# Patient Record
Sex: MALE | Race: WHITE | NOT HISPANIC OR LATINO
[De-identification: names, ages, dates, MRNs, and addresses within clinical notes are randomized per-mention and may not be internally consistent; named-entity substitution may affect disease eponyms.]

---

## 2022-09-09 ENCOUNTER — NON-APPOINTMENT (OUTPATIENT)
Age: 52
End: 2022-09-09

## 2022-09-09 PROBLEM — Z00.00 ENCOUNTER FOR PREVENTIVE HEALTH EXAMINATION: Status: ACTIVE | Noted: 2022-09-09

## 2022-09-13 ENCOUNTER — NON-APPOINTMENT (OUTPATIENT)
Age: 52
End: 2022-09-13

## 2022-09-13 ENCOUNTER — APPOINTMENT (OUTPATIENT)
Dept: PULMONOLOGY | Facility: CLINIC | Age: 52
End: 2022-09-13

## 2022-09-13 VITALS
WEIGHT: 140 LBS | BODY MASS INDEX: 22.5 KG/M2 | SYSTOLIC BLOOD PRESSURE: 100 MMHG | TEMPERATURE: 96.9 F | HEIGHT: 66 IN | DIASTOLIC BLOOD PRESSURE: 70 MMHG | OXYGEN SATURATION: 96 % | HEART RATE: 68 BPM

## 2022-09-13 DIAGNOSIS — Z82.49 FAMILY HISTORY OF ISCHEMIC HEART DISEASE AND OTHER DISEASES OF THE CIRCULATORY SYSTEM: ICD-10-CM

## 2022-09-13 PROCEDURE — 99205 OFFICE O/P NEW HI 60 MIN: CPT | Mod: 25

## 2022-09-13 PROCEDURE — 94010 BREATHING CAPACITY TEST: CPT

## 2022-09-13 NOTE — DISCUSSION/SUMMARY
[FreeTextEntry1] :  Mr yS has a chronic cough for many years.  I had an extensive discussion with him regarding the causes of cough.  It does not seem that he has any obstructive airways disease.  He likely has an allergy component and possible postnasal drip/sinus disease.  He does cough often with eating and drinking.  Although it is unlikely in a young otherwise young healthy person aspiration during the swallowing mechanism must be considered.  I have asked him to obtain a chest x-ray and an x-ray of the sinuses.  I have asked him to take an antihistamine daily not intermittently and to start Astelin nasal spray 2 sprays in each nostril once or twice a day.  I have also referred him to the [place] speech therapy department for swallow eval.  The patient understands and agrees with this plan of care.\par The patient understands and agrees with plan of care.\par Today's office visit encompassed 46  minutes. I conducted an extensive history ,physical exam and reviewed diagnosis and treatment options  including diagnostic tests,radiologic studies including cat scans  and the use of prescription medication.

## 2022-09-13 NOTE — REASON FOR VISIT
[Initial] : an initial visit [Cough] : cough [TextBox_44] : assessment.  Patient complains of a cough that comes from no where, it just comes on and it feels like it's coming from the back of his neck.

## 2022-09-13 NOTE — HISTORY OF PRESENT ILLNESS
[Never] : never [TextBox_4] : 51 male no hx tobacco\par presents today bec  increase cough  with drinking liquid and eating for 3 years\par yesterday with dust exposure \par feels something in back of throat\par no phlegm  always clearing throat\par use claritin and zyrtec and some improvement\par precipitating factors eating sl phlegm\par no nite awakening rarely with cough\par rare gerd sx \par no animals no birds \par allergy to dust and cats\par occ  computer  no asbestos /chemical\par

## 2022-09-15 ENCOUNTER — NON-APPOINTMENT (OUTPATIENT)
Age: 52
End: 2022-09-15

## 2022-09-15 ENCOUNTER — TRANSCRIPTION ENCOUNTER (OUTPATIENT)
Age: 52
End: 2022-09-15

## 2022-09-16 ENCOUNTER — NON-APPOINTMENT (OUTPATIENT)
Age: 52
End: 2022-09-16

## 2022-09-16 ENCOUNTER — TRANSCRIPTION ENCOUNTER (OUTPATIENT)
Age: 52
End: 2022-09-16

## 2022-10-17 ENCOUNTER — APPOINTMENT (OUTPATIENT)
Dept: PULMONOLOGY | Facility: CLINIC | Age: 52
End: 2022-10-17

## 2022-10-17 VITALS
OXYGEN SATURATION: 98 % | TEMPERATURE: 97.1 F | SYSTOLIC BLOOD PRESSURE: 118 MMHG | BODY MASS INDEX: 22.5 KG/M2 | HEIGHT: 66 IN | DIASTOLIC BLOOD PRESSURE: 70 MMHG | WEIGHT: 140 LBS | HEART RATE: 68 BPM

## 2022-10-17 DIAGNOSIS — R09.82 POSTNASAL DRIP: ICD-10-CM

## 2022-10-17 DIAGNOSIS — R05.3 CHRONIC COUGH: ICD-10-CM

## 2022-10-17 PROCEDURE — 99214 OFFICE O/P EST MOD 30 MIN: CPT

## 2022-10-17 NOTE — REASON FOR VISIT
[Follow-Up] : a follow-up visit [Cough] : cough [TextBox_44] : 1 month, Chest and sinus xray performed. Pt states sx are about the same as last office visit.

## 2022-10-17 NOTE — HISTORY OF PRESENT ILLNESS
[Never] : never [TextBox_4] : 52 male cough with eating and drinking for 3 yr\par cxr and sinus xray normal\par swallow study pending\par using astelin bec of taste \par some improvement with zyrtec \par usu cough related bec of pnds  and mucous in back of throat and trigger by dry food ie fibre cereal and nuts\par no change iwht position but ?worse when lie down\par occ  no chemical

## 2022-10-17 NOTE — DISCUSSION/SUMMARY
[FreeTextEntry1] :  Mr Sy has a chronic cough.  It seems to be mainly postnasal drip and allergy related.  He has some improvement on the Zyrtec.  He was unable to tolerate the taste of the Astelin.  We will start Atrovent nasal spray 2 sprays twice a day.  The patient has a swallow study pending regarding aspiration and I await these results.\par The patient understands and agrees with plan of care.\par Today's office visit encompassed 32 minutes. I conducted an extensive history ,physical exam and reviewed diagnosis and treatment options  including diagnostic tests,radiologic studies including  cat scans  and the use of prescription medication.

## 2022-12-12 ENCOUNTER — APPOINTMENT (OUTPATIENT)
Dept: PULMONOLOGY | Facility: CLINIC | Age: 52
End: 2022-12-12

## 2022-12-23 ENCOUNTER — RESULT CHARGE (OUTPATIENT)
Age: 52
End: 2022-12-23

## 2022-12-23 ENCOUNTER — APPOINTMENT (OUTPATIENT)
Dept: UROLOGY | Facility: CLINIC | Age: 52
End: 2022-12-23

## 2022-12-23 VITALS
DIASTOLIC BLOOD PRESSURE: 74 MMHG | HEIGHT: 66 IN | HEART RATE: 65 BPM | WEIGHT: 140 LBS | TEMPERATURE: 96.2 F | BODY MASS INDEX: 22.5 KG/M2 | OXYGEN SATURATION: 98 % | SYSTOLIC BLOOD PRESSURE: 120 MMHG

## 2022-12-23 DIAGNOSIS — Z83.518 FAMILY HISTORY OF OTHER SPECIFIED EYE DISORDER: ICD-10-CM

## 2022-12-23 DIAGNOSIS — Z82.0 FAMILY HISTORY OF EPILEPSY AND OTHER DISEASES OF THE NERVOUS SYSTEM: ICD-10-CM

## 2022-12-23 DIAGNOSIS — Z78.9 OTHER SPECIFIED HEALTH STATUS: ICD-10-CM

## 2022-12-23 DIAGNOSIS — Z82.61 FAMILY HISTORY OF ARTHRITIS: ICD-10-CM

## 2022-12-23 LAB
BILIRUB UR QL STRIP: NEGATIVE
CLARITY UR: CLEAR
COLLECTION METHOD: NORMAL
GLUCOSE UR-MCNC: NEGATIVE
HCG UR QL: 0.2 EU/DL
HGB UR QL STRIP.AUTO: NEGATIVE
KETONES UR-MCNC: NEGATIVE
LEUKOCYTE ESTERASE UR QL STRIP: NEGATIVE
NITRITE UR QL STRIP: NEGATIVE
PH UR STRIP: 7
PROT UR STRIP-MCNC: NEGATIVE
SP GR UR STRIP: 1.01

## 2022-12-23 PROCEDURE — 99204 OFFICE O/P NEW MOD 45 MIN: CPT

## 2022-12-23 RX ORDER — AZELASTINE HYDROCHLORIDE 137 UG/1
137 SPRAY, METERED NASAL DAILY
Qty: 1 | Refills: 6 | Status: DISCONTINUED | COMMUNITY
Start: 2022-09-13 | End: 2022-12-23

## 2022-12-23 RX ORDER — IPRATROPIUM BROMIDE 21 UG/1
0.03 SPRAY NASAL TWICE DAILY
Qty: 1 | Refills: 3 | Status: DISCONTINUED | COMMUNITY
Start: 2022-10-17 | End: 2022-12-23

## 2022-12-23 NOTE — PHYSICAL EXAM
[General Appearance - Well Developed] : well developed [Edema] : no peripheral edema [] : no respiratory distress [Abdomen Soft] : soft [Urinary Bladder Findings] : the bladder was normal on palpation [FreeTextEntry1] : LES: v small prostate, 3x3, mild nodularity felt. [Normal Station and Gait] : the gait and station were normal for the patient's age [No Focal Deficits] : no focal deficits [Oriented To Time, Place, And Person] : oriented to person, place, and time

## 2022-12-23 NOTE — ASSESSMENT
[FreeTextEntry1] : 51 yo patient who presents to clinic today with complaints of LUTS:slow stream since he was 20 yrs old. has incomplete emptying as well. Quality of his stream is  poor. POC urine neg\par IPSS:15 / PENNIE=23\par Fam Hx: neg for PCa/bladder ca\par no hx of STI. \par \par Plan: r/o BPH, r/o urethral stricture.\par Try tamsulosin 0.4 mg QHS\par US KB PS and PVR\par need latest PSA result\par

## 2022-12-23 NOTE — HISTORY OF PRESENT ILLNESS
[FreeTextEntry1] : 53 yo patient who presents to clinic today with complaints of LUTS: patient is already on finasteride 1mg for hair loss.\par slow stream since he was 20 yrs old. has incomplete emptying as well. \par Wakes up 1 x /night to use bathroom. \par Quality of his stream is  poor\par  \par Bowel Movements are regular. Denies history of colonic diverticulum/diverticulitis.  \par No  surgical Hx including Uroflow, UDS, Cystoscopy. Denies past UTI Hx, denies fever, chills, sweats, flank pain.  Denies history of kidney stones or bladder stones.  No Neurologic Hx.  No Hx of DM \par \par IPSS:15 / PENNIE=23\par SMoker:no\par Fam Hx: neg for PCa/bladder ca\par no blood thinners\par \par no hx of STI. \par

## 2022-12-23 NOTE — HISTORY OF PRESENT ILLNESS
[FreeTextEntry1] : 51 yo patient who presents to clinic today with complaints of LUTS: patient is already on finasteride 1mg for hair loss.\par slow stream since he was 20 yrs old. has incomplete emptying as well. \par Wakes up 1 x /night to use bathroom. \par Quality of his stream is  poor\par  \par Bowel Movements are regular. Denies history of colonic diverticulum/diverticulitis.  \par No  surgical Hx including Uroflow, UDS, Cystoscopy. Denies past UTI Hx, denies fever, chills, sweats, flank pain.  Denies history of kidney stones or bladder stones.  No Neurologic Hx.  No Hx of DM \par \par IPSS:15 / PENNIE=23\par SMoker:no\par Fam Hx: neg for PCa/bladder ca\par no blood thinners\par \par no hx of STI. \par

## 2023-01-06 ENCOUNTER — APPOINTMENT (OUTPATIENT)
Dept: UROLOGY | Facility: CLINIC | Age: 53
End: 2023-01-06
Payer: COMMERCIAL

## 2023-01-06 VITALS
OXYGEN SATURATION: 99 % | DIASTOLIC BLOOD PRESSURE: 68 MMHG | TEMPERATURE: 96.4 F | BODY MASS INDEX: 22.5 KG/M2 | HEIGHT: 66 IN | WEIGHT: 140 LBS | SYSTOLIC BLOOD PRESSURE: 108 MMHG | HEART RATE: 58 BPM

## 2023-01-06 DIAGNOSIS — R39.12 POOR URINARY STREAM: ICD-10-CM

## 2023-01-06 DIAGNOSIS — R39.9 UNSPECIFIED SYMPTOMS AND SIGNS INVOLVING THE GENITOURINARY SYSTEM: ICD-10-CM

## 2023-01-06 PROCEDURE — 99213 OFFICE O/P EST LOW 20 MIN: CPT

## 2023-01-06 NOTE — HISTORY OF PRESENT ILLNESS
[FreeTextEntry1] : Past Uro Hx: 53 yo patient who presents to clinic today with complaints of LUTS: patient is already on finasteride 1mg for hair loss.\par slow stream since he was 20 yrs old. has incomplete emptying as well. \par Wakes up 1 x /night to use bathroom. \par Quality of his stream is  poor\par  \par IPSS:15 / PENNIE=23\par SMoker:no\par Fam Hx: neg for PCa/bladder ca\par no blood thinners\par no hx of STI. \par FU jan 2023:\par US KB: PS=24g/PVR=13cc /sx on flomax:improved. only complaint is post-micturition dribbling\par

## 2023-01-06 NOTE — PHYSICAL EXAM
[General Appearance - Well Developed] : well developed [Abdomen Soft] : soft [Urinary Bladder Findings] : the bladder was normal on palpation [Edema] : no peripheral edema [] : no respiratory distress [Oriented To Time, Place, And Person] : oriented to person, place, and time [Normal Station and Gait] : the gait and station were normal for the patient's age [No Focal Deficits] : no focal deficits [FreeTextEntry1] : LES: v small prostate, 3x3, mild nodularity felt.

## 2023-01-06 NOTE — ASSESSMENT
[FreeTextEntry1] : 51 yo patient who presents to clinic today with complaints of LUTS:slow stream since he was 20 yrs old. has incomplete emptying as well. Quality of his stream is  poor. POC urine neg\par IPSS:15 / PENNIE=23\par Fam Hx: neg for PCa/bladder ca\par no hx of STI. \par FU jan 2023:\par US KB: PS=24g/PVR=13cc /sx on flomax:improved. only complaint is post-micturition dribbling/ PSa=0.6\par \par Plan:\par reassure\par rtc 1 year w PSA

## 2023-01-17 ENCOUNTER — RX RENEWAL (OUTPATIENT)
Age: 53
End: 2023-01-17

## 2023-01-18 ENCOUNTER — TRANSCRIPTION ENCOUNTER (OUTPATIENT)
Age: 53
End: 2023-01-18

## 2023-02-20 ENCOUNTER — RX RENEWAL (OUTPATIENT)
Age: 53
End: 2023-02-20

## 2023-04-10 ENCOUNTER — RX RENEWAL (OUTPATIENT)
Age: 53
End: 2023-04-10

## 2023-05-06 ENCOUNTER — RX RENEWAL (OUTPATIENT)
Age: 53
End: 2023-05-06

## 2023-05-07 RX ORDER — TAMSULOSIN HYDROCHLORIDE 0.4 MG/1
0.4 CAPSULE ORAL
Qty: 30 | Refills: 4 | Status: ACTIVE | COMMUNITY
Start: 2022-12-23 | End: 1900-01-01

## 2023-08-08 DIAGNOSIS — Z12.11 ENCOUNTER FOR SCREENING FOR MALIGNANT NEOPLASM OF COLON: ICD-10-CM

## 2023-08-25 ENCOUNTER — NON-APPOINTMENT (OUTPATIENT)
Age: 53
End: 2023-08-25

## 2023-08-25 ENCOUNTER — APPOINTMENT (OUTPATIENT)
Dept: FAMILY MEDICINE | Facility: CLINIC | Age: 53
End: 2023-08-25
Payer: COMMERCIAL

## 2023-08-25 VITALS
RESPIRATION RATE: 15 BRPM | SYSTOLIC BLOOD PRESSURE: 104 MMHG | BODY MASS INDEX: 21.86 KG/M2 | HEIGHT: 66 IN | DIASTOLIC BLOOD PRESSURE: 60 MMHG | WEIGHT: 136 LBS | OXYGEN SATURATION: 98 % | HEART RATE: 81 BPM | TEMPERATURE: 98.2 F

## 2023-08-25 DIAGNOSIS — B36.0 PITYRIASIS VERSICOLOR: ICD-10-CM

## 2023-08-25 DIAGNOSIS — R79.89 OTHER SPECIFIED ABNORMAL FINDINGS OF BLOOD CHEMISTRY: ICD-10-CM

## 2023-08-25 DIAGNOSIS — L65.9 NONSCARRING HAIR LOSS, UNSPECIFIED: ICD-10-CM

## 2023-08-25 DIAGNOSIS — R42 DIZZINESS AND GIDDINESS: ICD-10-CM

## 2023-08-25 PROCEDURE — 99204 OFFICE O/P NEW MOD 45 MIN: CPT | Mod: 25

## 2023-08-25 PROCEDURE — 36415 COLL VENOUS BLD VENIPUNCTURE: CPT

## 2023-08-25 PROCEDURE — 93000 ELECTROCARDIOGRAM COMPLETE: CPT

## 2023-08-25 RX ORDER — FINASTERIDE 1 MG/1
1 TABLET ORAL DAILY
Qty: 90 | Refills: 3 | Status: ACTIVE | COMMUNITY
Start: 1900-01-01 | End: 1900-01-01

## 2023-08-25 RX ORDER — CETIRIZINE HCL 10 MG
10 TABLET ORAL
Refills: 0 | Status: ACTIVE | COMMUNITY

## 2023-08-25 NOTE — HEALTH RISK ASSESSMENT
[No falls in past year] : Patient reported no falls in the past year [Little interest or pleasure doing things] : 1) Little interest or pleasure doing things [Feeling down, depressed, or hopeless] : 2) Feeling down, depressed, or hopeless [0] : 2) Feeling down, depressed, or hopeless: Not at all (0) [PHQ-2 Negative - No further assessment needed] : PHQ-2 Negative - No further assessment needed [I have developed a follow-up plan documented below in the note.] : I have developed a follow-up plan documented below in the note. [Patient declined Low Dose CT Scan] : Patient declined Low Dose CT Scan [HIV test declined] : HIV test declined [Hepatitis C test declined] : Hepatitis C test declined [None] : None [With Family] : lives with family [# of Members in Household ___] :  household currently consist of [unfilled] member(s) [Employed] : employed [Graduate School] : graduate school [] :  [# Of Children ___] : has [unfilled] children [Sexually Active] : sexually active [Feels Safe at Home] : Feels safe at home [Fully functional (bathing, dressing, toileting, transferring, walking, feeding)] : Fully functional (bathing, dressing, toileting, transferring, walking, feeding) [Fully functional (using the telephone, shopping, preparing meals, housekeeping, doing laundry, using] : Fully functional and needs no help or supervision to perform IADLs (using the telephone, shopping, preparing meals, housekeeping, doing laundry, using transportation, managing medications and managing finances) [Reports normal functional visual acuity (ie: able to read med bottle)] : Reports normal functional visual acuity [Smoke Detector] : smoke detector [Carbon Monoxide Detector] : carbon monoxide detector [Safety elements used in home] : safety elements used in home [Seat Belt] :  uses seat belt [Sunscreen] : uses sunscreen [With Patient/Caregiver] : , with patient/caregiver [Designated Healthcare Proxy] : Designated healthcare proxy [Name: ___] : Health Care Proxy's Name: [unfilled]  [Relationship: ___] : Relationship: [unfilled] [Aggressive treatment] : aggressive treatment [Never] : Never [No] : In the past 12 months have you used drugs other than those required for medical reasons? No [FreeTextEntry1] : establish care [de-identified] : no [de-identified] : dr dasha juarez, sight md [Audit-CScore] : 0 [de-identified] : dont work out specifically . he does chores , yard, snow,  [de-identified] : intermittent fasting. since 2015. fast mon and friday and limit self to 600 lorena. he drinks cofee and snacks and small meals. [EOR1Pboaq] : 0 [LowDoseCTScan] : 2023 [EyeExamDate] : 2023 due sight md [Change in mental status noted] : No change in mental status noted [Language] : denies difficulty with language [Behavior] : denies difficulty with behavior [Learning/Retaining New Information] : denies difficulty learning/retaining new information [Handling Complex Tasks] : denies difficulty handling complex tasks [Reasoning] : denies difficulty with reasoning [Spatial Ability and Orientation] : denies difficulty with spatial ability and orientation [Reports changes in hearing] : Reports no changes in hearing [Reports changes in vision] : Reports no changes in vision [Reports changes in dental health] : Reports no changes in dental health [Guns at Home] : no guns at home [Travel to Developing Areas] : does not  travel to developing areas [TB Exposure] : is not being exposed to tuberculosis [ColonoscopyDate] : 2023 [ColonoscopyComments] :  utd [AdvancecareDate] : 8/25/23

## 2023-08-25 NOTE — REVIEW OF SYSTEMS
[Hair Changes] : hair changes [Skin Rash] : skin rash [Dizziness] : dizziness [Negative] : Heme/Lymph [FreeTextEntry9] : left hand finger cant bend fully at distal phalanx [de-identified] : tinea versicolor [de-identified] : orthostatic hypotension

## 2023-08-25 NOTE — ASSESSMENT
[FreeTextEntry1] : 51 yo here to establish care. he is generally healthy. He has dribbling, post nasal drip, tinea versicolor, and alopecia. . he is on finasteride and flomax qod. he is tolerating it trying to balance the side effects. he is doing intermittent fasting and would like igf-1 monitored. he takes vit d supplements for previously low vit d.  EKG performed on this day in the office  to evaluate for any ischemia, new arrhythmia, and for any significant changes and reviewed by MARÍA Burrell. It is stable. Labs drawn/ specimens obtained  in office on this date of service  for evaluation of   assessed conditions -      to be run at [facility].  health maintenance   colonoscopy  utd immunizations. get flu shot in the fall We spent sufficient time to discuss aspects of care; questions were answered  to patient's satisfaction.The diagnosis and care plan were discussed with patient in detail.  Patient test results were  reviewed and explained in full. All questions and concerns  were answered to the best of my knowledge.

## 2023-08-25 NOTE — HISTORY OF PRESENT ILLNESS
[FreeTextEntry1] : patient presents to establish care  [de-identified] : 53 yo here to establish care Born in [place]. Healthy throughout his young life. He graduated college Masters, The only major injury was a ruptured left hand 2nd digit tendon and it required surgery Got  has 2 kids.  I see dr Norwood he is going to remove a lipoma. I am prone to tinea I sometimes have to take lamisil  or fluconazole  his left ankle dx w lesion ( or a bone bruise ) on talar dome. He had MRI. He saw ortho. He wears  squishy shoes. it hurt when he walked on hard surfaces. The dr said to get a brace. He wanted him to get special shoes.  he did not get the brace. He thinks his problem is from load bearing.   Mowing the lawn bothers it.   He is on tamsulosin. He sees Dr Manley. He is taking it qod  hopefully he would get  less side effects.  It could be a psychological side effects. he experiences retrograde ejaculation.  He is trying to find a middle ground of it helping his  dribbling . He is more mindful of the dribbling.   For his post nasal he takes zyrtec  for his alopecia he takes finasteride  He gets nausea from e mycin  He gets flu shots yearly  He got covid 7/29/23 Esperanza got it . We went to a wedding.   I am still not 100 percent yet. It was flu like.  I have fatigue. , I need more sleep. I feel tired by 5 pm.   I track the IGF1- it is from the intermittent fasting

## 2023-08-26 ENCOUNTER — TRANSCRIPTION ENCOUNTER (OUTPATIENT)
Age: 53
End: 2023-08-26

## 2023-08-26 LAB
25(OH)D3 SERPL-MCNC: 67.6 NG/ML
ALBUMIN SERPL ELPH-MCNC: 4.5 G/DL
ALP BLD-CCNC: 60 U/L
ALT SERPL-CCNC: 13 U/L
ANION GAP SERPL CALC-SCNC: 12 MMOL/L
AST SERPL-CCNC: 12 U/L
BILIRUB SERPL-MCNC: 0.3 MG/DL
BUN SERPL-MCNC: 16 MG/DL
CALCIUM SERPL-MCNC: 10 MG/DL
CHLORIDE SERPL-SCNC: 103 MMOL/L
CHOLEST SERPL-MCNC: 190 MG/DL
CO2 SERPL-SCNC: 26 MMOL/L
CREAT SERPL-MCNC: 0.96 MG/DL
EGFR: 95 ML/MIN/1.73M2
ESTIMATED AVERAGE GLUCOSE: 111 MG/DL
FERRITIN SERPL-MCNC: 178 NG/ML
FOLATE SERPL-MCNC: >20 NG/ML
GLUCOSE SERPL-MCNC: 95 MG/DL
HBA1C MFR BLD HPLC: 5.5 %
HDLC SERPL-MCNC: 50 MG/DL
IGF-1 INTERP: NORMAL
IGF-I BLD-MCNC: 157 NG/ML
IRON SATN MFR SERPL: 35 %
IRON SERPL-MCNC: 106 UG/DL
LDLC SERPL CALC-MCNC: 128 MG/DL
MAGNESIUM SERPL-MCNC: 2.2 MG/DL
NONHDLC SERPL-MCNC: 139 MG/DL
POTASSIUM SERPL-SCNC: 4.5 MMOL/L
PROT SERPL-MCNC: 7.5 G/DL
PSA SERPL-MCNC: 0.81 NG/ML
SODIUM SERPL-SCNC: 141 MMOL/L
T3 SERPL-MCNC: 101 NG/DL
T3FREE SERPL-MCNC: 3.05 PG/ML
T4 FREE SERPL-MCNC: 1.4 NG/DL
TIBC SERPL-MCNC: 303 UG/DL
TRIGL SERPL-MCNC: 59 MG/DL
TSH SERPL-ACNC: 1.43 UIU/ML
UIBC SERPL-MCNC: 197 UG/DL
VIT B12 SERPL-MCNC: 801 PG/ML

## 2023-08-28 DIAGNOSIS — Z13.220 ENCOUNTER FOR SCREENING FOR LIPOID DISORDERS: ICD-10-CM

## 2023-08-28 DIAGNOSIS — Z23 ENCOUNTER FOR IMMUNIZATION: ICD-10-CM

## 2023-08-28 DIAGNOSIS — M94.9 DISORDER OF BONE, UNSPECIFIED: ICD-10-CM

## 2023-08-28 DIAGNOSIS — Z13.0 ENCOUNTER FOR SCREENING FOR DISEASES OF THE BLOOD AND BLOOD-FORMING ORGANS AND CERTAIN DISORDERS INVOLVING THE IMMUNE MECHANISM: ICD-10-CM

## 2023-08-28 DIAGNOSIS — Z13.29 ENCOUNTER FOR SCREENING FOR OTHER SUSPECTED ENDOCRINE DISORDER: ICD-10-CM

## 2023-08-28 DIAGNOSIS — Z13.1 ENCOUNTER FOR SCREENING FOR DIABETES MELLITUS: ICD-10-CM

## 2023-08-28 DIAGNOSIS — Z13.228 ENCOUNTER FOR SCREENING FOR OTHER METABOLIC DISORDERS: ICD-10-CM

## 2023-08-28 DIAGNOSIS — M89.9 DISORDER OF BONE, UNSPECIFIED: ICD-10-CM

## 2023-12-29 ENCOUNTER — OFFICE (OUTPATIENT)
Dept: URBAN - METROPOLITAN AREA CLINIC 103 | Facility: CLINIC | Age: 53
Setting detail: OPHTHALMOLOGY
End: 2023-12-29
Payer: COMMERCIAL

## 2023-12-29 DIAGNOSIS — H35.3111: ICD-10-CM

## 2023-12-29 DIAGNOSIS — H52.7: ICD-10-CM

## 2023-12-29 DIAGNOSIS — H53.001: ICD-10-CM

## 2023-12-29 DIAGNOSIS — H16.223: ICD-10-CM

## 2023-12-29 DIAGNOSIS — H43.811: ICD-10-CM

## 2023-12-29 DIAGNOSIS — H25.13: ICD-10-CM

## 2023-12-29 DIAGNOSIS — H44.23: ICD-10-CM

## 2023-12-29 PROCEDURE — 92250 FUNDUS PHOTOGRAPHY W/I&R: CPT | Performed by: OPHTHALMOLOGY

## 2023-12-29 PROCEDURE — 92014 COMPRE OPH EXAM EST PT 1/>: CPT | Performed by: OPHTHALMOLOGY

## 2023-12-29 PROCEDURE — 92015 DETERMINE REFRACTIVE STATE: CPT | Performed by: OPHTHALMOLOGY

## 2023-12-29 ASSESSMENT — REFRACTION_MANIFEST
OD_SPHERE: BALANCE
OD_ADD: +2.00
OS_VA1: 20/25+2
OS_AXIS: 170
OD_VA1: HM
OS_AXIS: 175
OS_ADD: +2.25
OD_ADD: +2.25
OS_VA1: 20/20
OD_SPHERE: BALANCE
OS_CYLINDER: -1.75
OS_ADD: +2.00
OD_VA1: CF 3FT
OS_SPHERE: -8.25
OS_CYLINDER: -1.25
OS_SPHERE: -8.00

## 2023-12-29 ASSESSMENT — SPHEQUIV_DERIVED
OS_SPHEQUIV: -8.875
OS_SPHEQUIV: -8.375
OS_SPHEQUIV: -8.875

## 2023-12-29 ASSESSMENT — REFRACTION_CURRENTRX
OS_VPRISM_DIRECTION: PROGS
OS_OVR_VA: 20/
OS_CYLINDER: -2.00
OD_OVR_VA: 20/
OD_VPRISM_DIRECTION: PROGS
OD_ADD: +2.00
OD_SPHERE: BALANCE
OS_SPHERE: -8.00
OS_AXIS: 169
OS_ADD: +2.00

## 2023-12-29 ASSESSMENT — REFRACTION_AUTOREFRACTION
OD_SPHERE: UTP
OS_CYLINDER: -1.25
OS_SPHERE: -7.75
OS_AXIS: 173

## 2023-12-29 ASSESSMENT — TEAR BREAK UP TIME (TBUT)
OS_TBUT: 1+
OD_TBUT: 1+

## 2024-01-03 DIAGNOSIS — Z76.89 PERSONS ENCOUNTERING HEALTH SERVICES IN OTHER SPECIFIED CIRCUMSTANCES: ICD-10-CM

## 2024-07-03 ENCOUNTER — OFFICE (OUTPATIENT)
Dept: URBAN - METROPOLITAN AREA CLINIC 103 | Facility: CLINIC | Age: 54
Setting detail: OPHTHALMOLOGY
End: 2024-07-03
Payer: COMMERCIAL

## 2024-07-03 DIAGNOSIS — H16.223: ICD-10-CM

## 2024-07-03 DIAGNOSIS — H43.813: ICD-10-CM

## 2024-07-03 DIAGNOSIS — H25.13: ICD-10-CM

## 2024-07-03 PROCEDURE — 92014 COMPRE OPH EXAM EST PT 1/>: CPT | Performed by: OPHTHALMOLOGY

## 2024-07-03 ASSESSMENT — CONFRONTATIONAL VISUAL FIELD TEST (CVF)
OS_FINDINGS: FULL
OD_FINDINGS: FULL

## 2024-07-08 ENCOUNTER — OFFICE (OUTPATIENT)
Dept: URBAN - METROPOLITAN AREA CLINIC 105 | Facility: CLINIC | Age: 54
Setting detail: OPHTHALMOLOGY
End: 2024-07-08
Payer: COMMERCIAL

## 2024-07-08 DIAGNOSIS — H43.813: ICD-10-CM

## 2024-07-08 DIAGNOSIS — H44.23: ICD-10-CM

## 2024-07-08 DIAGNOSIS — H33.312: ICD-10-CM

## 2024-07-08 DIAGNOSIS — H33.302: ICD-10-CM

## 2024-07-08 DIAGNOSIS — H04.123 DRY EYE SYNDROME OF BILATERAL LACRIMAL GLANDS: ICD-10-CM

## 2024-07-08 PROCEDURE — 92201 OPSCPY EXTND RTA DRAW UNI/BI: CPT | Performed by: OPHTHALMOLOGY

## 2024-07-08 PROCEDURE — 92014 COMPRE OPH EXAM EST PT 1/>: CPT | Mod: 25 | Performed by: OPHTHALMOLOGY

## 2024-07-08 PROCEDURE — 67145 PROPH RTA DTCHMNT PC: CPT | Mod: LT | Performed by: OPHTHALMOLOGY

## 2024-07-08 PROCEDURE — 92134 CPTRZ OPH DX IMG PST SGM RTA: CPT | Performed by: OPHTHALMOLOGY

## 2024-07-08 ASSESSMENT — CONFRONTATIONAL VISUAL FIELD TEST (CVF)
OS_FINDINGS: FULL
OD_FINDINGS: FULL

## 2024-07-23 ENCOUNTER — OFFICE (OUTPATIENT)
Dept: URBAN - METROPOLITAN AREA CLINIC 94 | Facility: CLINIC | Age: 54
Setting detail: OPHTHALMOLOGY
End: 2024-07-23
Payer: COMMERCIAL

## 2024-07-23 DIAGNOSIS — H44.23: ICD-10-CM

## 2024-07-23 DIAGNOSIS — H53.001: ICD-10-CM

## 2024-07-23 DIAGNOSIS — H43.813: ICD-10-CM

## 2024-07-23 DIAGNOSIS — H33.312: ICD-10-CM

## 2024-07-23 PROCEDURE — 92134 CPTRZ OPH DX IMG PST SGM RTA: CPT | Performed by: OPHTHALMOLOGY

## 2024-07-23 PROCEDURE — 92012 INTRM OPH EXAM EST PATIENT: CPT | Performed by: OPHTHALMOLOGY

## 2024-07-23 ASSESSMENT — CONFRONTATIONAL VISUAL FIELD TEST (CVF)
OS_FINDINGS: FULL
OD_FINDINGS: FULL

## 2024-07-25 ENCOUNTER — APPOINTMENT (OUTPATIENT)
Dept: CARDIOLOGY | Facility: CLINIC | Age: 54
End: 2024-07-25
Payer: COMMERCIAL

## 2024-07-25 ENCOUNTER — NON-APPOINTMENT (OUTPATIENT)
Age: 54
End: 2024-07-25

## 2024-07-25 VITALS
DIASTOLIC BLOOD PRESSURE: 60 MMHG | OXYGEN SATURATION: 98 % | SYSTOLIC BLOOD PRESSURE: 104 MMHG | BODY MASS INDEX: 23.3 KG/M2 | HEART RATE: 74 BPM | WEIGHT: 145 LBS | HEIGHT: 66 IN

## 2024-07-25 DIAGNOSIS — R94.31 ABNORMAL ELECTROCARDIOGRAM [ECG] [EKG]: ICD-10-CM

## 2024-07-25 PROCEDURE — 93000 ELECTROCARDIOGRAM COMPLETE: CPT

## 2024-07-25 PROCEDURE — 99204 OFFICE O/P NEW MOD 45 MIN: CPT | Mod: 25

## 2024-07-25 NOTE — DISCUSSION/SUMMARY
[EKG obtained to assist in diagnosis and management of assessed problem(s)] : EKG obtained to assist in diagnosis and management of assessed problem(s) [FreeTextEntry1] : Pt is a 52 y/o M PMH HLD Family hx: father MI ?40's/CABG 64/AF, uncle MI 52  Will check transthoracic echocardiogram to evaluate left ventricular function and assess for any structural abnormalities  check CUS check CCTA   Family History/HLD: We discussed the importance of aggressive risk factor modification, including continuing medications as directed, following a healthy diet of unprocessed, low saturated fat and carbohydrate diet as close to a plant based or Mediterranean as possible, regular exercise at least 30 minutes of cardiovascular exercise daily.  Also advised to report any symptoms immediately.   Most recent available lab results were reviewed with pt. The described plan was discussed with the pt.  All questions and concerns were addressed to the best of my knowledge.

## 2024-07-25 NOTE — HISTORY OF PRESENT ILLNESS
[FreeTextEntry1] : Pt is a 54 y/o M who is referred here today by their PCP for evaluation.  Pt has PMH HLD Family hx: father MI ?40's/CABG 64/AF, uncle MI 52 Pt is feeling well overall.  He denies CP, SOB, diaphoresis, palpitations, dizziness, syncope, LE edema, PND, orthopnea.    Previous surgeries: finger surgery 2016 - no problems with anesthesia Family hx: no SCD, father MI ?40's/CABG 64/AF, uncle MI 52 Smoking status: never no ETOH no drug use Current exercise: none Daily water intake: 16-20oz Daily caffeine intake: 3 cups coffee, 1 cup soda OTC medications: zyrtec Previous cardiac testin stress test and TTE "normal"

## 2024-08-14 ENCOUNTER — OFFICE (OUTPATIENT)
Dept: URBAN - METROPOLITAN AREA CLINIC 103 | Facility: CLINIC | Age: 54
Setting detail: OPHTHALMOLOGY
End: 2024-08-14
Payer: COMMERCIAL

## 2024-08-14 DIAGNOSIS — H43.813: ICD-10-CM

## 2024-08-14 DIAGNOSIS — H44.23: ICD-10-CM

## 2024-08-14 DIAGNOSIS — H53.001: ICD-10-CM

## 2024-08-14 DIAGNOSIS — H33.312: ICD-10-CM

## 2024-08-14 PROCEDURE — 92012 INTRM OPH EXAM EST PATIENT: CPT | Performed by: OPHTHALMOLOGY

## 2024-08-14 PROCEDURE — 92134 CPTRZ OPH DX IMG PST SGM RTA: CPT | Performed by: OPHTHALMOLOGY

## 2024-09-02 ENCOUNTER — RX RENEWAL (OUTPATIENT)
Age: 54
End: 2024-09-02

## 2024-09-03 ENCOUNTER — TRANSCRIPTION ENCOUNTER (OUTPATIENT)
Age: 54
End: 2024-09-03

## 2024-09-06 ENCOUNTER — APPOINTMENT (OUTPATIENT)
Dept: FAMILY MEDICINE | Facility: CLINIC | Age: 54
End: 2024-09-06
Payer: COMMERCIAL

## 2024-09-06 VITALS
SYSTOLIC BLOOD PRESSURE: 98 MMHG | HEART RATE: 69 BPM | OXYGEN SATURATION: 98 % | TEMPERATURE: 98 F | HEIGHT: 66 IN | RESPIRATION RATE: 15 BRPM | WEIGHT: 149 LBS | BODY MASS INDEX: 23.95 KG/M2 | DIASTOLIC BLOOD PRESSURE: 70 MMHG

## 2024-09-06 DIAGNOSIS — Z00.00 ENCOUNTER FOR GENERAL ADULT MEDICAL EXAMINATION W/OUT ABNORMAL FINDINGS: ICD-10-CM

## 2024-09-06 DIAGNOSIS — Z13.0 ENCOUNTER FOR SCREENING FOR DISEASES OF THE BLOOD AND BLOOD-FORMING ORGANS AND CERTAIN DISORDERS INVOLVING THE IMMUNE MECHANISM: ICD-10-CM

## 2024-09-06 DIAGNOSIS — Z12.5 ENCOUNTER FOR SCREENING FOR MALIGNANT NEOPLASM OF PROSTATE: ICD-10-CM

## 2024-09-06 DIAGNOSIS — E78.5 HYPERLIPIDEMIA, UNSPECIFIED: ICD-10-CM

## 2024-09-06 PROCEDURE — 99386 PREV VISIT NEW AGE 40-64: CPT

## 2024-09-06 PROCEDURE — 36415 COLL VENOUS BLD VENIPUNCTURE: CPT

## 2024-09-06 NOTE — HEALTH RISK ASSESSMENT
[No] : In the past 12 months have you used drugs other than those required for medical reasons? No [No falls in past year] : Patient reported no falls in the past year [0] : 2) Feeling down, depressed, or hopeless: Not at all (0) [PHQ-2 Negative - No further assessment needed] : PHQ-2 Negative - No further assessment needed [Never] : Never [With Family] : lives with family [Employed] : employed [Graduate School] : graduate school [] :  [# Of Children ___] : has [unfilled] children [Feels Safe at Home] : Feels safe at home [Fully functional (bathing, dressing, toileting, transferring, walking, feeding)] : Fully functional (bathing, dressing, toileting, transferring, walking, feeding) [Fully functional (using the telephone, shopping, preparing meals, housekeeping, doing laundry, using] : Fully functional and needs no help or supervision to perform IADLs (using the telephone, shopping, preparing meals, housekeeping, doing laundry, using transportation, managing medications and managing finances) [Reports changes in vision] : Reports changes in vision [Smoke Detector] : smoke detector [Carbon Monoxide Detector] : carbon monoxide detector [Seat Belt] :  uses seat belt [de-identified] : none [de-identified] : urology, cardiology [de-identified] : active around this house [de-identified] : intermittent fasting [RGN6Snzyk] : 0 [Change in mental status noted] : No change in mental status noted [Language] : denies difficulty with language [Behavior] : denies difficulty with behavior [Learning/Retaining New Information] : denies difficulty learning/retaining new information [Handling Complex Tasks] : denies difficulty handling complex tasks [Reasoning] : denies difficulty with reasoning [Spatial Ability and Orientation] : denies difficulty with spatial ability and orientation [Reports changes in hearing] : Reports no changes in hearing [Reports changes in dental health] : Reports no changes in dental health [Sunscreen] : does not use sunscreen [TB Exposure] : is not being exposed to tuberculosis [ColonoscopyComments] : last colonoscopy in Sept 2019; needs to have it repeated in 10 years [FreeTextEntry2] :  [de-identified] : seeing eye doctors due to floaters and flashes; had retinal tear (which was repaired)

## 2024-09-06 NOTE — PHYSICAL EXAM
[No Acute Distress] : no acute distress [Well Nourished] : well nourished [Well Developed] : well developed [Well-Appearing] : well-appearing [Normal Sclera/Conjunctiva] : normal sclera/conjunctiva [Normal Outer Ear/Nose] : the outer ears and nose were normal in appearance [No JVD] : no jugular venous distention [No Respiratory Distress] : no respiratory distress  [No Accessory Muscle Use] : no accessory muscle use [Clear to Auscultation] : lungs were clear to auscultation bilaterally [Normal Rate] : normal rate  [Regular Rhythm] : with a regular rhythm [Normal S1, S2] : normal S1 and S2 [No Murmur] : no murmur heard [No Edema] : there was no peripheral edema [Normal Gait] : normal gait [Normal Affect] : the affect was normal [Normal Insight/Judgement] : insight and judgment were intact

## 2024-09-06 NOTE — HISTORY OF PRESENT ILLNESS
[FreeTextEntry1] : Here for annual physical. Fasting.  [de-identified] : Patient presents for a physical.   Would like IGF testing due studies of longevity.  He usually tracks these yearly.   Otherwise feels well. Saw urologist a while back and was taken off of Tamsulosin.  Now only on Finasteride. Uses Zyrtec for allergies. Seeing cardiologist soon for some testing.

## 2024-09-06 NOTE — PLAN
[FreeTextEntry1] : Patient presents for a physical.  Routine labs ordered (CBC, CMP, TSH, Lipids, U/a).  Would like PSA testing.  Would like IGF testing as he is looking into studies about longevity.  Refilled Finasteride 1mg qd (using it for hair).

## 2024-09-09 LAB
ALBUMIN SERPL ELPH-MCNC: 4.6 G/DL
ALP BLD-CCNC: 58 U/L
ALT SERPL-CCNC: 20 U/L
ANION GAP SERPL CALC-SCNC: 13 MMOL/L
APPEARANCE: CLEAR
AST SERPL-CCNC: 18 U/L
BASOPHILS # BLD AUTO: 0.05 K/UL
BASOPHILS NFR BLD AUTO: 1 %
BILIRUB SERPL-MCNC: 0.3 MG/DL
BILIRUBIN URINE: NEGATIVE
BLOOD URINE: NEGATIVE
BUN SERPL-MCNC: 15 MG/DL
CALCIUM SERPL-MCNC: 9.8 MG/DL
CHLORIDE SERPL-SCNC: 103 MMOL/L
CHOLEST SERPL-MCNC: 212 MG/DL
CO2 SERPL-SCNC: 27 MMOL/L
COLOR: NORMAL
CREAT SERPL-MCNC: 0.96 MG/DL
EGFR: 94 ML/MIN/1.73M2
EOSINOPHIL # BLD AUTO: 0.19 K/UL
EOSINOPHIL NFR BLD AUTO: 3.6 %
GLUCOSE QUALITATIVE U: NEGATIVE MG/DL
GLUCOSE SERPL-MCNC: 98 MG/DL
HCT VFR BLD CALC: 45.8 %
HDLC SERPL-MCNC: 55 MG/DL
HGB BLD-MCNC: 14.3 G/DL
IGF-1 INTERP: NORMAL
IGF-I BLD-MCNC: 167 NG/ML
IMM GRANULOCYTES NFR BLD AUTO: 0.2 %
KETONES URINE: NEGATIVE MG/DL
LDLC SERPL CALC-MCNC: 147 MG/DL
LEUKOCYTE ESTERASE URINE: NEGATIVE
LYMPHOCYTES # BLD AUTO: 1.81 K/UL
LYMPHOCYTES NFR BLD AUTO: 34.4 %
MAN DIFF?: NORMAL
MCHC RBC-ENTMCNC: 29.9 PG
MCHC RBC-ENTMCNC: 31.2 GM/DL
MCV RBC AUTO: 95.6 FL
MONOCYTES # BLD AUTO: 0.39 K/UL
MONOCYTES NFR BLD AUTO: 7.4 %
NEUTROPHILS # BLD AUTO: 2.81 K/UL
NEUTROPHILS NFR BLD AUTO: 53.4 %
NITRITE URINE: NEGATIVE
NONHDLC SERPL-MCNC: 158 MG/DL
PH URINE: 7.5
PLATELET # BLD AUTO: 263 K/UL
POTASSIUM SERPL-SCNC: 5 MMOL/L
PROT SERPL-MCNC: 7.4 G/DL
PROTEIN URINE: NORMAL MG/DL
PSA FREE FLD-MCNC: 20 %
PSA FREE SERPL-MCNC: 0.14 NG/ML
PSA SERPL-MCNC: 0.7 NG/ML
RBC # BLD: 4.79 M/UL
RBC # FLD: 13 %
SODIUM SERPL-SCNC: 143 MMOL/L
SPECIFIC GRAVITY URINE: 1.02
TRIGL SERPL-MCNC: 61 MG/DL
TSH SERPL-ACNC: 2.43 UIU/ML
UROBILINOGEN URINE: 1 MG/DL
WBC # FLD AUTO: 5.26 K/UL

## 2024-09-27 ENCOUNTER — APPOINTMENT (OUTPATIENT)
Dept: CARDIOLOGY | Facility: CLINIC | Age: 54
End: 2024-09-27
Payer: COMMERCIAL

## 2024-09-27 PROCEDURE — 93880 EXTRACRANIAL BILAT STUDY: CPT

## 2024-11-13 ENCOUNTER — OFFICE (OUTPATIENT)
Dept: URBAN - METROPOLITAN AREA CLINIC 103 | Facility: CLINIC | Age: 54
Setting detail: OPHTHALMOLOGY
End: 2024-11-13
Payer: COMMERCIAL

## 2024-11-13 DIAGNOSIS — H44.23: ICD-10-CM

## 2024-11-13 DIAGNOSIS — H53.001: ICD-10-CM

## 2024-11-13 DIAGNOSIS — H43.813: ICD-10-CM

## 2024-11-13 DIAGNOSIS — H33.312: ICD-10-CM

## 2024-11-13 DIAGNOSIS — H33.012: ICD-10-CM

## 2024-11-13 PROCEDURE — 92012 INTRM OPH EXAM EST PATIENT: CPT | Mod: 25 | Performed by: OPHTHALMOLOGY

## 2024-11-13 PROCEDURE — 67105 REPAIR DETACHED RETINA PC: CPT | Mod: LT | Performed by: OPHTHALMOLOGY

## 2024-11-13 PROCEDURE — 92134 CPTRZ OPH DX IMG PST SGM RTA: CPT | Performed by: OPHTHALMOLOGY

## 2024-11-13 ASSESSMENT — VISUAL ACUITY
OD_BCVA: 20/20-1
OS_BCVA: CF 2FT

## 2024-11-13 ASSESSMENT — CONFRONTATIONAL VISUAL FIELD TEST (CVF)
OD_FINDINGS: FULL
OS_FINDINGS: FULL

## 2024-11-13 ASSESSMENT — KERATOMETRY
OD_K2POWER_DIOPTERS: 44.50
OD_AXISANGLE_DEGREES: 012
OS_AXISANGLE_DEGREES: 076
OS_K1POWER_DIOPTERS: 43.75
OD_K1POWER_DIOPTERS: 43.25
OS_K2POWER_DIOPTERS: 45.25

## 2024-11-13 ASSESSMENT — REFRACTION_AUTOREFRACTION
OD_SPHERE: UTP
OS_CYLINDER: -1.25
OS_SPHERE: -7.50
OS_AXIS: 167

## 2024-11-13 ASSESSMENT — TEAR BREAK UP TIME (TBUT)
OD_TBUT: 1+
OS_TBUT: 1+

## 2024-11-13 ASSESSMENT — TONOMETRY
OD_IOP_MMHG: 17
OS_IOP_MMHG: 16

## 2024-12-03 ENCOUNTER — APPOINTMENT (OUTPATIENT)
Dept: CARDIOLOGY | Facility: CLINIC | Age: 54
End: 2024-12-03
Payer: COMMERCIAL

## 2024-12-03 PROCEDURE — 93015 CV STRESS TEST SUPVJ I&R: CPT

## 2024-12-11 ENCOUNTER — OFFICE (OUTPATIENT)
Dept: URBAN - METROPOLITAN AREA CLINIC 103 | Facility: CLINIC | Age: 54
Setting detail: OPHTHALMOLOGY
End: 2024-12-11
Payer: COMMERCIAL

## 2024-12-11 DIAGNOSIS — H33.012: ICD-10-CM

## 2024-12-11 PROCEDURE — 92134 CPTRZ OPH DX IMG PST SGM RTA: CPT | Performed by: OPHTHALMOLOGY

## 2024-12-11 PROCEDURE — 99024 POSTOP FOLLOW-UP VISIT: CPT | Performed by: OPHTHALMOLOGY

## 2024-12-11 ASSESSMENT — REFRACTION_AUTOREFRACTION
OS_AXIS: 167
OS_CYLINDER: -1.25
OS_SPHERE: -7.50
OD_SPHERE: UTP

## 2024-12-11 ASSESSMENT — KERATOMETRY
OS_AXISANGLE_DEGREES: 076
OD_K2POWER_DIOPTERS: 44.50
OS_K2POWER_DIOPTERS: 45.25
OD_K1POWER_DIOPTERS: 43.25
OD_AXISANGLE_DEGREES: 012
OS_K1POWER_DIOPTERS: 43.75

## 2024-12-11 ASSESSMENT — VISUAL ACUITY
OD_BCVA: 20/25
OS_BCVA: CF 3FT

## 2024-12-11 ASSESSMENT — TEAR BREAK UP TIME (TBUT)
OD_TBUT: 1+
OS_TBUT: 1+

## 2024-12-11 ASSESSMENT — TONOMETRY
OS_IOP_MMHG: 16
OD_IOP_MMHG: 17

## 2025-02-12 ENCOUNTER — OFFICE (OUTPATIENT)
Dept: URBAN - METROPOLITAN AREA CLINIC 103 | Facility: CLINIC | Age: 55
Setting detail: OPHTHALMOLOGY
End: 2025-02-12
Payer: COMMERCIAL

## 2025-02-12 DIAGNOSIS — H44.23: ICD-10-CM

## 2025-02-12 DIAGNOSIS — H33.012: ICD-10-CM

## 2025-02-12 DIAGNOSIS — H53.001: ICD-10-CM

## 2025-02-12 DIAGNOSIS — H33.312: ICD-10-CM

## 2025-02-12 PROCEDURE — 92134 CPTRZ OPH DX IMG PST SGM RTA: CPT | Performed by: OPHTHALMOLOGY

## 2025-02-12 PROCEDURE — 92012 INTRM OPH EXAM EST PATIENT: CPT | Performed by: OPHTHALMOLOGY

## 2025-02-12 ASSESSMENT — TEAR BREAK UP TIME (TBUT)
OS_TBUT: 1+
OD_TBUT: 1+

## 2025-02-12 ASSESSMENT — REFRACTION_AUTOREFRACTION
OS_CYLINDER: -1.25
OD_SPHERE: UTP
OS_AXIS: 167
OS_SPHERE: -7.50

## 2025-02-12 ASSESSMENT — VISUAL ACUITY
OS_BCVA: CF 3FT
OD_BCVA: 20/20-2

## 2025-02-12 ASSESSMENT — KERATOMETRY
OS_K2POWER_DIOPTERS: 45.25
OD_K1POWER_DIOPTERS: 43.25
OD_K2POWER_DIOPTERS: 44.50
OS_AXISANGLE_DEGREES: 076
OD_AXISANGLE_DEGREES: 012
OS_K1POWER_DIOPTERS: 43.75

## 2025-02-12 ASSESSMENT — CONFRONTATIONAL VISUAL FIELD TEST (CVF)
OS_FINDINGS: FULL
OD_FINDINGS: FULL

## 2025-02-12 ASSESSMENT — TONOMETRY
OS_IOP_MMHG: 16
OD_IOP_MMHG: 18

## 2025-03-10 ENCOUNTER — APPOINTMENT (OUTPATIENT)
Dept: FAMILY MEDICINE | Facility: CLINIC | Age: 55
End: 2025-03-10
Payer: COMMERCIAL

## 2025-03-10 ENCOUNTER — NON-APPOINTMENT (OUTPATIENT)
Age: 55
End: 2025-03-10

## 2025-03-10 VITALS
OXYGEN SATURATION: 98 % | HEIGHT: 66 IN | WEIGHT: 152 LBS | BODY MASS INDEX: 24.43 KG/M2 | RESPIRATION RATE: 15 BRPM | SYSTOLIC BLOOD PRESSURE: 98 MMHG | DIASTOLIC BLOOD PRESSURE: 60 MMHG | HEART RATE: 75 BPM

## 2025-03-10 DIAGNOSIS — Z01.818 ENCOUNTER FOR OTHER PREPROCEDURAL EXAMINATION: ICD-10-CM

## 2025-03-10 PROCEDURE — 99214 OFFICE O/P EST MOD 30 MIN: CPT

## 2025-03-12 ENCOUNTER — TRANSCRIPTION ENCOUNTER (OUTPATIENT)
Age: 55
End: 2025-03-12

## 2025-03-12 ENCOUNTER — OUTPATIENT (OUTPATIENT)
Dept: OUTPATIENT SERVICES | Facility: HOSPITAL | Age: 55
LOS: 1 days | End: 2025-03-12
Payer: COMMERCIAL

## 2025-03-12 VITALS
OXYGEN SATURATION: 99 % | SYSTOLIC BLOOD PRESSURE: 101 MMHG | HEIGHT: 66 IN | TEMPERATURE: 98 F | WEIGHT: 146.83 LBS | HEART RATE: 74 BPM | DIASTOLIC BLOOD PRESSURE: 70 MMHG | RESPIRATION RATE: 12 BRPM

## 2025-03-12 VITALS
SYSTOLIC BLOOD PRESSURE: 104 MMHG | DIASTOLIC BLOOD PRESSURE: 60 MMHG | HEART RATE: 64 BPM | OXYGEN SATURATION: 100 % | RESPIRATION RATE: 12 BRPM

## 2025-03-12 DIAGNOSIS — H33.012 RETINAL DETACHMENT WITH SINGLE BREAK, LEFT EYE: ICD-10-CM

## 2025-03-12 DIAGNOSIS — Z87.39 PERSONAL HISTORY OF OTHER DISEASES OF THE MUSCULOSKELETAL SYSTEM AND CONNECTIVE TISSUE: Chronic | ICD-10-CM

## 2025-03-12 DIAGNOSIS — Z98.890 OTHER SPECIFIED POSTPROCEDURAL STATES: Chronic | ICD-10-CM

## 2025-03-12 PROCEDURE — 67108 REPAIR DETACHED RETINA: CPT | Mod: AS,LT

## 2025-03-12 PROCEDURE — 67108 REPAIR DETACHED RETINA: CPT | Mod: LT

## 2025-03-12 PROCEDURE — C1889: CPT

## 2025-03-12 PROCEDURE — C1784: CPT

## 2025-03-12 DEVICE — GS SF6 SULFER HEXAFLUORIDE 3 L 20GM: Type: IMPLANTABLE DEVICE | Site: LEFT | Status: FUNCTIONAL

## 2025-03-12 DEVICE — SLEEVE OVAL STYLE S3083: Type: IMPLANTABLE DEVICE | Site: LEFT | Status: FUNCTIONAL

## 2025-03-12 DEVICE — TIRE SILICONE STYLE 240: Type: IMPLANTABLE DEVICE | Site: LEFT | Status: FUNCTIONAL

## 2025-03-12 DEVICE — LASER PROBE 25G CONSTELLATION: Type: IMPLANTABLE DEVICE | Site: LEFT | Status: FUNCTIONAL

## 2025-03-12 DEVICE — PERFLUORON 7ML KIT: Type: IMPLANTABLE DEVICE | Site: LEFT | Status: FUNCTIONAL

## 2025-03-12 RX ORDER — FINASTERIDE 1 MG/1
1 TABLET, FILM COATED ORAL
Refills: 0 | DISCHARGE

## 2025-03-12 NOTE — ASU DISCHARGE PLAN (ADULT/PEDIATRIC) - PROVIDER TOKENS
FREE:[LAST:[Ling],FIRST:[Mareclino],PHONE:[phone]],FAX:[(   )    -],ADDRESS:[[address] [place] [address]],SCHEDULEDAPPT:[03/13/2025],SCHEDULEDAPPTTIME:[09:30 AM]]

## 2025-03-12 NOTE — ASU PATIENT PROFILE, ADULT - FALL HARM RISK - HARM RISK INTERVENTIONS
General (Pediatric)
Communicate Risk of Fall with Harm to all staff/Reinforce activity limits and safety measures with patient and family/Tailored Fall Risk Interventions/Visual Cue: Yellow wristband and red socks/Bed in lowest position, wheels locked, appropriate side rails in place/Call bell, personal items and telephone in reach/Instruct patient to call for assistance before getting out of bed or chair/Non-slip footwear when patient is out of bed/[place] to call system/Physically safe environment - no spills, clutter or unnecessary equipment/Purposeful Proactive Rounding/Room/bathroom lighting operational, light cord in reach

## 2025-03-12 NOTE — ASU DISCHARGE PLAN (ADULT/PEDIATRIC) - CARE PROVIDER_API CALL
Marcelino Dubon   [address]  [place] [address]  Phone: [phone]  Fax: (   )    -  Scheduled Appointment: 03/13/2025 09:30 AM

## 2025-03-12 NOTE — ASU DISCHARGE PLAN (ADULT/PEDIATRIC) - FINANCIAL ASSISTANCE
[facility] provides services at a reduced cost to those who are determined to be eligible through [facility]’s financial assistance program. Information regarding [facility]’s financial assistance program can be found by going to [email or web address] or by calling [phone].

## 2025-05-06 ENCOUNTER — APPOINTMENT (OUTPATIENT)
Dept: FAMILY MEDICINE | Facility: CLINIC | Age: 55
End: 2025-05-06
Payer: COMMERCIAL

## 2025-05-06 VITALS
HEIGHT: 66 IN | SYSTOLIC BLOOD PRESSURE: 120 MMHG | OXYGEN SATURATION: 97 % | HEART RATE: 66 BPM | DIASTOLIC BLOOD PRESSURE: 76 MMHG | BODY MASS INDEX: 24.11 KG/M2 | RESPIRATION RATE: 12 BRPM | WEIGHT: 150 LBS

## 2025-05-06 DIAGNOSIS — M54.50 LOW BACK PAIN, UNSPECIFIED: ICD-10-CM

## 2025-05-06 PROCEDURE — 99213 OFFICE O/P EST LOW 20 MIN: CPT

## 2025-05-13 DIAGNOSIS — E78.5 HYPERLIPIDEMIA, UNSPECIFIED: ICD-10-CM

## 2025-05-30 ENCOUNTER — OFFICE (OUTPATIENT)
Dept: URBAN - METROPOLITAN AREA CLINIC 103 | Facility: CLINIC | Age: 55
Setting detail: OPHTHALMOLOGY
End: 2025-05-30
Payer: COMMERCIAL

## 2025-05-30 DIAGNOSIS — H16.223: ICD-10-CM

## 2025-05-30 DIAGNOSIS — H25.043: ICD-10-CM

## 2025-05-30 DIAGNOSIS — H25.13: ICD-10-CM

## 2025-05-30 DIAGNOSIS — H52.7: ICD-10-CM

## 2025-05-30 PROCEDURE — 92015 DETERMINE REFRACTIVE STATE: CPT | Performed by: OPHTHALMOLOGY

## 2025-05-30 PROCEDURE — 92012 INTRM OPH EXAM EST PATIENT: CPT | Performed by: OPHTHALMOLOGY

## 2025-05-30 ASSESSMENT — TEAR BREAK UP TIME (TBUT)
OD_TBUT: 1+
OS_TBUT: 1+

## 2025-05-30 ASSESSMENT — VISUAL ACUITY
OS_BCVA: CF 1FT
OD_BCVA: 20/30-2

## 2025-05-30 ASSESSMENT — REFRACTION_MANIFEST
OD_ADD: +2.50
OS_AXIS: 180
OS_VA1: 20/25
OS_ADD: +2.50
OD_SPHERE: BALANCE
OS_SPHERE: -10.50
OS_CYLINDER: -2.00

## 2025-05-30 ASSESSMENT — REFRACTION_CURRENTRX
OS_ADD: +2.25
OD_OVR_VA: 20/
OS_AXIS: 165
OS_SPHERE: -11.50
OS_OVR_VA: 20/
OS_CYLINDER: -2.00

## 2025-05-30 ASSESSMENT — KERATOMETRY
OS_K1POWER_DIOPTERS: 43.50
OS_K2POWER_DIOPTERS: 45.75
OD_AXISANGLE_DEGREES: 103
OD_K2POWER_DIOPTERS: 44.50
OD_K1POWER_DIOPTERS: 43.50
OS_AXISANGLE_DEGREES: 086

## 2025-05-30 ASSESSMENT — REFRACTION_AUTOREFRACTION
OS_SPHERE: -10.25
OS_AXIS: 178
OD_SPHERE: UNABLE
OS_CYLINDER: -2.75

## 2025-05-30 ASSESSMENT — TONOMETRY
OD_IOP_MMHG: 17
OS_IOP_MMHG: 15

## 2025-06-03 ENCOUNTER — TRANSCRIPTION ENCOUNTER (OUTPATIENT)
Age: 55
End: 2025-06-03

## 2025-07-16 ENCOUNTER — APPOINTMENT (OUTPATIENT)
Facility: CLINIC | Age: 55
End: 2025-07-16
Payer: COMMERCIAL

## 2025-07-16 VITALS
SYSTOLIC BLOOD PRESSURE: 104 MMHG | OXYGEN SATURATION: 97 % | HEART RATE: 75 BPM | DIASTOLIC BLOOD PRESSURE: 74 MMHG | BODY MASS INDEX: 24.11 KG/M2 | HEIGHT: 66 IN | WEIGHT: 150 LBS

## 2025-07-16 PROBLEM — E78.5 DYSLIPIDEMIA: Status: ACTIVE | Noted: 2025-07-16

## 2025-07-16 PROBLEM — I25.10 CORONARY ARTERY DISEASE INVOLVING NATIVE CORONARY ARTERY OF NATIVE HEART WITHOUT ANGINA PECTORIS: Status: ACTIVE | Noted: 2025-07-16

## 2025-07-16 PROCEDURE — 93000 ELECTROCARDIOGRAM COMPLETE: CPT

## 2025-07-16 PROCEDURE — 99214 OFFICE O/P EST MOD 30 MIN: CPT | Mod: 25

## 2025-07-16 RX ORDER — ROSUVASTATIN CALCIUM 20 MG/1
20 TABLET, FILM COATED ORAL DAILY
Qty: 90 | Refills: 3 | Status: ACTIVE | COMMUNITY
Start: 2025-07-16 | End: 1900-01-01

## 2025-07-17 ENCOUNTER — NON-APPOINTMENT (OUTPATIENT)
Age: 55
End: 2025-07-17

## 2025-08-26 ENCOUNTER — APPOINTMENT (OUTPATIENT)
Facility: CLINIC | Age: 55
End: 2025-08-26
Payer: COMMERCIAL

## 2025-08-26 VITALS
HEIGHT: 66 IN | HEART RATE: 70 BPM | SYSTOLIC BLOOD PRESSURE: 120 MMHG | WEIGHT: 150 LBS | BODY MASS INDEX: 24.11 KG/M2 | OXYGEN SATURATION: 99 % | DIASTOLIC BLOOD PRESSURE: 74 MMHG

## 2025-08-26 DIAGNOSIS — R93.1 ABNORMAL FINDINGS ON DIAGNOSTIC IMAGING OF HEART AND CORONARY CIRCULATION: ICD-10-CM

## 2025-08-26 PROCEDURE — 99214 OFFICE O/P EST MOD 30 MIN: CPT

## 2025-09-10 ENCOUNTER — APPOINTMENT (OUTPATIENT)
Dept: FAMILY MEDICINE | Facility: CLINIC | Age: 55
End: 2025-09-10
Payer: COMMERCIAL

## 2025-09-10 VITALS
DIASTOLIC BLOOD PRESSURE: 80 MMHG | HEART RATE: 70 BPM | RESPIRATION RATE: 15 BRPM | TEMPERATURE: 98.4 F | SYSTOLIC BLOOD PRESSURE: 118 MMHG | OXYGEN SATURATION: 98 % | HEIGHT: 66 IN | BODY MASS INDEX: 24.43 KG/M2 | WEIGHT: 152 LBS

## 2025-09-10 DIAGNOSIS — Z80.1 FAMILY HISTORY OF MALIGNANT NEOPLASM OF TRACHEA, BRONCHUS AND LUNG: ICD-10-CM

## 2025-09-10 DIAGNOSIS — Z00.00 ENCOUNTER FOR GENERAL ADULT MEDICAL EXAMINATION W/OUT ABNORMAL FINDINGS: ICD-10-CM

## 2025-09-10 DIAGNOSIS — Z81.8 FAMILY HISTORY OF OTHER MENTAL AND BEHAVIORAL DISORDERS: ICD-10-CM

## 2025-09-10 DIAGNOSIS — Z13.29 ENCOUNTER FOR SCREENING FOR OTHER SUSPECTED ENDOCRINE DISORDER: ICD-10-CM

## 2025-09-10 PROCEDURE — 99396 PREV VISIT EST AGE 40-64: CPT

## (undated) DEVICE — ELCTR BIPOLAR CORD BAUSCH & LOMB 12FT DISP

## (undated) DEVICE — SOL BALANCE SALT 15ML

## (undated) DEVICE — DIATHERMY PROBE 25GA

## (undated) DEVICE — BLADE SCLERAL #57

## (undated) DEVICE — CONSTELLATION TOTAL PLUS PAK 23G

## (undated) DEVICE — SUT PLAIN GUT 6-0 18" TG140-8

## (undated) DEVICE — BEAVER BLADE MIN-BLADE ROUNDED TIP 1-SIDE SHARP (GREEN)

## (undated) DEVICE — SOL IRR POUR H2O 250ML

## (undated) DEVICE — CANNULA ALCON SOFT TIP 25G

## (undated) DEVICE — SUT SILK 2-0 12-18"

## (undated) DEVICE — AUTO GAS FILL CONSTELLATION

## (undated) DEVICE — CANNULA ALCON SOFT TIP 23G

## (undated) DEVICE — SUT VICRYL 7-0 12" TG140-8 DA

## (undated) DEVICE — WARMING BLANKET LOWER ADULT

## (undated) DEVICE — SOL IRR BAL SALT + 500ML

## (undated) DEVICE — CANNULA MEDONE FLEXTIP 25G

## (undated) DEVICE — LIGHT SHIELD CORNEAL

## (undated) DEVICE — PACK CONSTELLATION POST 25G 20K

## (undated) DEVICE — VENODYNE/SCD SLEEVE CALF MEDIUM

## (undated) DEVICE — SUT VICRYL 8-0 12" TG140-8 DA

## (undated) DEVICE — GLV 7 PROTEXIS (WHITE)

## (undated) DEVICE — DRAPE STERI-DRAPE INCISE 13X13"

## (undated) DEVICE — PACK VITRECTOMY

## (undated) DEVICE — POSITIONER FOAM HEAD CRADLE (PINK)

## (undated) DEVICE — SYE-LASER - CONSTELLATION MACHINE #3 1101051201X: Type: DURABLE MEDICAL EQUIPMENT

## (undated) DEVICE — NDL HYPO SAFE 22G X 1.5" (BLACK)

## (undated) DEVICE — SUT ETHILON 5-0 18" RD-1